# Patient Record
Sex: MALE | URBAN - METROPOLITAN AREA
[De-identification: names, ages, dates, MRNs, and addresses within clinical notes are randomized per-mention and may not be internally consistent; named-entity substitution may affect disease eponyms.]

---

## 2023-03-28 ENCOUNTER — APPOINTMENT (RX ONLY)
Dept: URBAN - METROPOLITAN AREA CLINIC 33 | Facility: CLINIC | Age: 18
Setting detail: DERMATOLOGY
End: 2023-03-28

## 2023-03-28 VITALS
HEIGHT: 71 IN | HEART RATE: 85 BPM | SYSTOLIC BLOOD PRESSURE: 115 MMHG | DIASTOLIC BLOOD PRESSURE: 68 MMHG | WEIGHT: 146 LBS

## 2023-03-28 DIAGNOSIS — K13.0 DISEASES OF LIPS: ICD-10-CM

## 2023-03-28 PROCEDURE — ? COUNSELING

## 2023-03-28 PROCEDURE — ? PRESCRIPTION

## 2023-03-28 PROCEDURE — 99202 OFFICE O/P NEW SF 15 MIN: CPT

## 2023-03-28 RX ORDER — TACROLIMUS 1 MG/G
THIN LAYER 1/2 GM OINTMENT TOPICAL BID
Qty: 30 | Refills: 0 | Status: ERX | COMMUNITY
Start: 2023-03-28

## 2023-03-28 RX ADMIN — TACROLIMUS THIN LAYER 1/2 GM: 1 OINTMENT TOPICAL at 00:00

## 2023-03-28 ASSESSMENT — LOCATION DETAILED DESCRIPTION DERM
LOCATION DETAILED: RIGHT SUPERIOR VERMILION LIP
LOCATION DETAILED: RIGHT INFERIOR VERMILION LIP

## 2023-03-28 ASSESSMENT — LOCATION SIMPLE DESCRIPTION DERM: LOCATION SIMPLE: RIGHT LIP

## 2023-03-28 ASSESSMENT — LOCATION ZONE DERM: LOCATION ZONE: LIP

## 2023-03-28 NOTE — PROCEDURE: COUNSELING
Patient Specific Counseling (Will Not Stick From Patient To Patient): Patient is having challenges with his lips.  He is here for school from [place] - sophomore year at [employer].  He states about 4 months ago - he started having challenges on the lips.  he isn't sure the cause- he doesn't, according to him, lick his lips, doesn't have braces or wear guards at night.  He was prescribed some Triamcinolone which he has used off and on- but it hasn't resolved it.  \\n\\nHe does get dry skin and when flapping he tends to pull it off.  Also in observing he may be licking some - it isn't as obvious as some.  Discussed with him likely something his skin is coming in contact with- and then dryness and irritation.  Suggest we change things around and have him also be conscientious a bit about how takes place on the lips.  We will move to Vanicare ointment (he washes his face with a baby wash) and Tacrolimus to get away from the steroid.  He will treat and then will hear his story and review at his next visit to see if improvement or tolerability in 2 weeks.  \\n\\nWritten directions reviewed - and given. Discussed tacrolimus - use risks and potential stinging and adverse effects.  Reviewed black box warning.  \\n\\n\\nCheilitis  - and/or contact dermatitis\\n\\nThis can be caused by moisture or exposure to an irritant.  So let's change things out a bit- and see if we can get it to clear This may take a few weeks- you may not be clear when seeing you back.  Just follow plan - as is - and avoid temptation to \"try\" other things.  \\n\\nTREATMENT:\\n1.  Gentle wash to the face 2 x daily - \\n2.  Apply Tacrolimus/Protopic (trade name) to the lips- around the lips and corners of the mouth 2 x daily.  (IF stinging - use 1 x daily in the AM then increase to 2 x daily when stinging stops.\\n3.  After each application of medication follow with Vanicare ointment.  \\n4. Bedtime - generously apply the Vanicare to and around the lips- (like a big rectangle)  \\n5.  Frequently use the Vanicare ointment throughout the day\\n6.  Avoid picking at the lips or trying to exfoliate the lips- this traumatizes them.  You may clip dead skin if needed or just more moisturizer.  Try not to touch/or manipulate the lips other than application of the above.\\n7.  Avoid - lip licking - if you are doing\\n8.  Return for follow up\\n\\nSending in 1 prescription for Tacrolimus 0.1%- 30 gm tube- check coupon on [facility]- for cash price and compare with what the quote you for insurance price.  \\n\\nCall if questions
Detail Level: Simple
Aquaphor Recommendations: Actually recommend Vanicare Ointment - Aquaphor if she has trouble finding it.

## 2023-04-10 ENCOUNTER — APPOINTMENT (RX ONLY)
Dept: URBAN - METROPOLITAN AREA CLINIC 33 | Facility: CLINIC | Age: 18
Setting detail: DERMATOLOGY
End: 2023-04-10

## 2023-04-10 VITALS
HEIGHT: 71 IN | SYSTOLIC BLOOD PRESSURE: 107 MMHG | WEIGHT: 146 LBS | HEART RATE: 78 BPM | DIASTOLIC BLOOD PRESSURE: 52 MMHG

## 2023-04-10 DIAGNOSIS — K13.0 DISEASES OF LIPS: ICD-10-CM | Status: IMPROVED

## 2023-04-10 PROCEDURE — ? COUNSELING

## 2023-04-10 PROCEDURE — 99212 OFFICE O/P EST SF 10 MIN: CPT

## 2023-04-10 ASSESSMENT — LOCATION DETAILED DESCRIPTION DERM
LOCATION DETAILED: LEFT SUPERIOR VERMILION LIP
LOCATION DETAILED: RIGHT SUPERIOR VERMILION LIP

## 2023-04-10 ASSESSMENT — LOCATION SIMPLE DESCRIPTION DERM
LOCATION SIMPLE: LEFT LIP
LOCATION SIMPLE: RIGHT LIP

## 2023-04-10 ASSESSMENT — LOCATION ZONE DERM: LOCATION ZONE: LIP

## 2023-04-10 NOTE — PROCEDURE: COUNSELING
Patient Specific Counseling (Will Not Stick From Patient To Patient): Patient followed directions and treated and is doing well and is pleased overall.  he has slight erythema central upper vermillion border- may be telangiectasias related or still some irritation.  Recommend the following:\\n1.  Continue treatment as planned below - using Tacrolimus 2 x daily as noted in directions.\\n2.  If improved after 1 more week - move to 1 x daily of Tacrolimus same application follow with moisturizer\\n3.  If things are still fine - discontinue Tacrolimus and keep doing all the other things.\\n4.  If it flares a bit resume plan- and then stop when clear\\n5.  If eruption is different than it has been - say pustules, vesicles or crusting - has to be seen before doing anything.\\n6.  Discussed Vanicare ointment, Aquaphor and methods to help\\n\\nCall if concerns . If any type of refill is required he will need to be seen first. \\n\\nPREVIOUS NOTE:\\nPatient is having challenges with his lips.  He is here for school from [place] - sophomore year at [employer].  He states about 4 months ago - he started having challenges on the lips.  he isn't sure the cause- he doesn't, according to him, lick his lips, doesn't have braces or wear guards at night.  He was prescribed some Triamcinolone which he has used off and on- but it hasn't resolved it.  \\n\\nHe does get dry skin and when flapping he tends to pull it off.  Also in observing he may be licking some - it isn't as obvious as some.  Discussed with him likely something his skin is coming in contact with- and then dryness and irritation.  Suggest we change things around and have him also be conscientious a bit about how takes place on the lips.  We will move to Vanicare ointment (he washes his face with a baby wash) and Tacrolimus to get away from the steroid.  He will treat and then will hear his story and review at his next visit to see if improvement or tolerability in 2 weeks.  \\n\\nWritten directions reviewed - and given. Discussed tacrolimus - use risks and potential stinging and adverse effects.  Reviewed black box warning.  \\n\\n\\nCheilitis  - and/or contact dermatitis\\n\\nThis can be caused by moisture or exposure to an irritant.  So let's change things out a bit- and see if we can get it to clear This may take a few weeks- you may not be clear when seeing you back.  Just follow plan - as is - and avoid temptation to \"try\" other things.  \\n\\nTREATMENT:\\n1.  Gentle wash to the face 2 x daily - \\n2.  Apply Tacrolimus/Protopic (trade name) to the lips- around the lips and corners of the mouth 2 x daily.  (IF stinging - use 1 x daily in the AM then increase to 2 x daily when stinging stops.\\n3.  After each application of medication follow with Vanicare ointment.  \\n4. Bedtime - generously apply the Vanicare to and around the lips- (like a big rectangle)  \\n5.  Frequently use the Vanicare ointment throughout the day\\n6.  Avoid picking at the lips or trying to exfoliate the lips- this traumatizes them.  You may clip dead skin if needed or just more moisturizer.  Try not to touch/or manipulate the lips other than application of the above.\\n7.  Avoid - lip licking - if you are doing\\n8.  Return for follow up\\n\\nSending in 1 prescription for Tacrolimus 0.1%- 30 gm tube- check coupon on [facility]- for cash price and compare with what the quote you for insurance price.  \\n\\nCall if questions
Detail Level: Simple
Aquaphor Recommendations: Actually recommend Vanicare Ointment - Aquaphor if she has trouble finding it.

## 2023-11-30 ENCOUNTER — APPOINTMENT (RX ONLY)
Dept: URBAN - METROPOLITAN AREA CLINIC 33 | Facility: CLINIC | Age: 18
Setting detail: DERMATOLOGY
End: 2023-11-30

## 2023-11-30 DIAGNOSIS — K13.0 DISEASES OF LIPS: ICD-10-CM

## 2023-11-30 PROCEDURE — 99212 OFFICE O/P EST SF 10 MIN: CPT

## 2023-11-30 PROCEDURE — ? COUNSELING

## 2023-11-30 ASSESSMENT — LOCATION SIMPLE DESCRIPTION DERM
LOCATION SIMPLE: LEFT LIP
LOCATION SIMPLE: RIGHT LIP

## 2023-11-30 ASSESSMENT — LOCATION DETAILED DESCRIPTION DERM
LOCATION DETAILED: LEFT PHILTRAL RIDGE
LOCATION DETAILED: RIGHT UPPER CUTANEOUS LIP
LOCATION DETAILED: LEFT UPPER CUTANEOUS LIP

## 2023-11-30 ASSESSMENT — LOCATION ZONE DERM: LOCATION ZONE: LIP

## 2023-11-30 NOTE — PROCEDURE: COUNSELING
Patient Specific Counseling (Will Not Stick From Patient To Patient): **The patient has improved with treatment. He is not consistent with his treatment and he is using Vanicram as a lubricant. I suggested moving to Vaseline or some type of petrolatum containing produce. This should be applied 2-3 times a hour when flared and always at night before bedtime. I suggested moving to Dr Amada mckeon when he flares. This is available over the counter. He has Protopic and it is compatible with Vaseline. Samples of Vaseline and Dr. Amada mckeon was given to him.
Detail Level: Simple